# Patient Record
Sex: MALE | Race: WHITE | NOT HISPANIC OR LATINO | Employment: FULL TIME | URBAN - METROPOLITAN AREA
[De-identification: names, ages, dates, MRNs, and addresses within clinical notes are randomized per-mention and may not be internally consistent; named-entity substitution may affect disease eponyms.]

---

## 2018-12-04 ENCOUNTER — HOSPITAL ENCOUNTER (OUTPATIENT)
Dept: RADIOLOGY | Facility: MEDICAL CENTER | Age: 48
End: 2018-12-04
Attending: PHYSICIAN ASSISTANT
Payer: COMMERCIAL

## 2018-12-04 ENCOUNTER — OFFICE VISIT (OUTPATIENT)
Dept: URGENT CARE | Facility: CLINIC | Age: 48
End: 2018-12-04
Payer: COMMERCIAL

## 2018-12-04 VITALS
HEART RATE: 89 BPM | HEIGHT: 69 IN | OXYGEN SATURATION: 96 % | DIASTOLIC BLOOD PRESSURE: 80 MMHG | BODY MASS INDEX: 34.8 KG/M2 | TEMPERATURE: 98.3 F | SYSTOLIC BLOOD PRESSURE: 130 MMHG | WEIGHT: 235 LBS

## 2018-12-04 DIAGNOSIS — Z87.442 HISTORY OF KIDNEY STONES: ICD-10-CM

## 2018-12-04 DIAGNOSIS — R10.9 RIGHT FLANK PAIN: ICD-10-CM

## 2018-12-04 DIAGNOSIS — N20.0 MULTIPLE RENAL CALCULI: ICD-10-CM

## 2018-12-04 DIAGNOSIS — N50.811 PAIN IN RIGHT TESTICLE: Primary | ICD-10-CM

## 2018-12-04 DIAGNOSIS — N50.811 PAIN IN RIGHT TESTICLE: ICD-10-CM

## 2018-12-04 PROCEDURE — 74176 CT ABD & PELVIS W/O CONTRAST: CPT

## 2018-12-04 PROCEDURE — 99204 OFFICE O/P NEW MOD 45 MIN: CPT | Performed by: PHYSICIAN ASSISTANT

## 2018-12-04 PROCEDURE — 76870 US EXAM SCROTUM: CPT

## 2018-12-04 RX ORDER — HYDROCODONE BITARTRATE AND ACETAMINOPHEN 5; 325 MG/1; MG/1
1-2 TABLET ORAL EVERY 4 HOURS PRN
Qty: 20 TAB | Refills: 0 | Status: SHIPPED | OUTPATIENT
Start: 2018-12-04 | End: 2018-12-07

## 2018-12-04 RX ORDER — TAMSULOSIN HYDROCHLORIDE 0.4 MG/1
0.4 CAPSULE ORAL
Qty: 30 CAP | Refills: 0 | Status: SHIPPED | OUTPATIENT
Start: 2018-12-04

## 2018-12-04 RX ORDER — SULFAMETHOXAZOLE AND TRIMETHOPRIM 800; 160 MG/1; MG/1
1 TABLET ORAL 2 TIMES DAILY
Qty: 20 TAB | Refills: 0 | Status: SHIPPED | OUTPATIENT
Start: 2018-12-04 | End: 2018-12-14

## 2018-12-04 RX ORDER — METHYLPREDNISOLONE 4 MG/1
4 TABLET ORAL DAILY
Qty: 21 TAB | Refills: 0 | Status: SHIPPED | OUTPATIENT
Start: 2018-12-04 | End: 2018-12-10

## 2018-12-04 ASSESSMENT — PATIENT HEALTH QUESTIONNAIRE - PHQ9: CLINICAL INTERPRETATION OF PHQ2 SCORE: 0

## 2018-12-04 NOTE — PATIENT INSTRUCTIONS
Orchitis  Introduction  Orchitis is swelling (inflammation) of a testicle caused by infection. Testicles are the male organs that produce sperm. The testicles are held in a fleshy sac (scrotum) located behind the penis. Orchitis usually affects only one testicle, but it can occur in both. The condition can develop suddenly.  Orchitis can be caused by many different kinds of bacteria and viruses.  What are the causes?  Orchitis can be caused by either a bacterial or viral infection.  Bacterial Infections  · These often occur along with an infection of the coiled tube that collects sperm and sits on top of the testicle (epididymis).  · In men who are not sexually active, bacterial orchitis usually starts as a urinary tract infection and spreads to the testicle.  · In sexually active men, sexually transmitted infections are the most common cause of bacterial orchitis. These can include:  ¨ Gonorrhea.  ¨ Chlamydia.  Viral Infections  · Mumps is still the most common cause of viral orchitis, though mumps is now rare in many areas because of vaccination.  · Other viruses that can cause orchitis include:  ¨ The chickenpox virus (varicella-zoster virus).  ¨ The virus that causes mononucleosis (Shannan-Barr virus).  What increases the risk?  Boys and men who have not been vaccinated against mumps are at risk for mumps orchitis.  Risk factors for bacterial orchitis include:  · Frequent urinary tract infections.  · High-risk sexual behaviors.  · Having a sexual partner with a sexually transmitted infection.  · Having had urinary tract surgery.  · Using a tube passed through the penis to drain urine (Brar catheter).  · An enlarged prostate gland.  What are the signs or symptoms?  The most common symptoms of orchitis are swelling and pain in the scrotum. Other signs and symptoms may include:  · Feeling generally sick (malaise).  · Fever and chills.  · Painful urination.  · Painful ejaculation.  · Blood or discharge from the  penis.  · Nausea.  · Headache.  · Fatigue.  How is this diagnosed?  Your health care provider may suspect orchitis if you have a painful, swollen testicle along with other signs and symptoms of the condition. A physical exam will be done. Tests may also be done to help your health care provider make a diagnosis. These may include:  · A blood test to check for signs of infection.  · A urine test to check for a urinary tract infection.  · Using a swab to collect a fluid sample from the tip of the penis to test for sexually transmitted infections.  · Taking an image of the testicle using sound waves and a computer (testicular ultrasound).  How is this treated?  Treatment of orchitis depends on the cause. For orchitis caused by a bacterial infection, your health care provider will most likely prescribe antibiotic medicines. Bacterial infections usually clear up within a few days.  Both viral infections and bacterial infections may be treated with:  · Bed rest.  · Anti-inflammatory medicines.  · Pain medicines.  · Elevating the scrotum and applying ice.  Follow these instructions at home:  · Rest as directed by your health care provider.  · Take medicines only as directed by your health care provider.  · If you were prescribed an antibiotic medicine, finish it all even if you start to feel better.  · Elevate your scrotum and apply ice as directed:  ¨ Put ice in a plastic bag.  ¨ Place a small towel or pillow between your legs.  ¨ Rest your scrotum on the pillow or towel.  ¨ Place another towel between your skin and the plastic bag.  ¨ Leave the ice on for 20 minutes, 2-3 times a day.  Contact a health care provider if:  · You have a fever.  · Pain and swelling have not gotten better after 3 days.  Get help right away if:  · Your pain is getting worse.  · The swelling in your testicle gets worse.  This information is not intended to replace advice given to you by your health care provider. Make sure you discuss any  questions you have with your health care provider.  Document Released: 12/15/2001 Document Revised: 05/25/2017 Document Reviewed: 05/07/2015  © 2017 Elsevier

## 2018-12-04 NOTE — PROGRESS NOTES
"Subjective:      Pt is a 48 y.o. male who presents with Testicle Pain (right side ) and Back Pain (lower right )            HPI  This is a new problem. Pt notes ongoing right testicle pain x 2 weeks with right flank pain as well. Pt notes waxing and waning pain with the worst pain this morning that he had to leave work. Pt has not taken any Rx medications for this condition. Pt states the pain is a 7/10, aching in nature and worse \"right now\". Pt notes hx of passing 4 kidney stones and is unsure of the etiology of the pain. Pt notes flatulence and urination worsen the pain.  Pt denies CP, SOB, NVD, paresthesias, headaches, dizziness, change in vision, hives, or other joint pain. The pt's medication list, problem list, and allergies have been evaluated and reviewed during today's visit.    PMH:  Past Medical History:   Diagnosis Date   • Hypertension    • Indigestion    • Kidney stones    • Psychiatric problem     depression anxiety       PSH:  Negative per pt.      Fam Hx:  the patient's family history is not pertinent to their current complaint      Soc HX:  Social History     Social History   • Marital status: Single     Spouse name: N/A   • Number of children: N/A   • Years of education: N/A     Occupational History   • Not on file.     Social History Main Topics   • Smoking status: Current Every Day Smoker     Packs/day: 0.50     Years: 18.00   • Smokeless tobacco: Never Used   • Alcohol use Yes      Comment: 1/5 of michael a day the last few days   • Drug use: No   • Sexual activity: Not on file     Other Topics Concern   • Not on file     Social History Narrative   • No narrative on file         Medications:    Current Outpatient Prescriptions:   •  HYDROcodone-acetaminophen (NORCO) 5-325 MG Tab per tablet, Take 1-2 Tabs by mouth every four hours as needed for up to 3 days., Disp: 20 Tab, Rfl: 0  •  meclizine (ANTIVERT) 25 MG TABS, Take 1 Tab by mouth 3 times a day as needed., Disp: 30 Each, Rfl: " "1      Allergies:  Nkda [no known drug allergy]    ROS  Constitutional: Negative for fever, chills and malaise/fatigue.   HENT: Negative for congestion and sore throat.    Eyes: Negative for blurred vision, double vision and photophobia.   Respiratory: Negative for cough and shortness of breath.  Cardiovascular: Negative for chest pain and palpitations.   Gastrointestinal: Negative for heartburn, nausea, vomiting, abdominal pain, diarrhea and constipation.   Genitourinary: Negative for dysuria and POS for right flank pain and right testicle pain  Musculoskeletal: Negative for joint pain and myalgias.   Skin: Negative for itching and rash.   Neurological: Negative for dizziness, tingling and headaches.   Endo/Heme/Allergies: Does not bruise/bleed easily.   Psychiatric/Behavioral: Negative for depression. The patient is not nervous/anxious.           Objective:     /80 (BP Location: Left arm, Patient Position: Sitting, BP Cuff Size: Adult)   Pulse 89   Temp 36.8 °C (98.3 °F)   Ht 1.753 m (5' 9\")   Wt 106.6 kg (235 lb)   SpO2 96%   BMI 34.70 kg/m²      Physical Exam   Abdominal: Hernia confirmed negative in the right inguinal area and confirmed negative in the left inguinal area.   Genitourinary: Penis normal. Cremasteric reflex is present. Right testis shows swelling and tenderness. Right testis shows no mass. Right testis is descended. Cremasteric reflex is not absent on the right side. Left testis shows no mass, no swelling and no tenderness. Left testis is descended. Cremasteric reflex is not absent on the left side. Circumcised. No phimosis, paraphimosis, hypospadias, penile erythema or penile tenderness. No discharge found.         Musculoskeletal:        Back:    Lymphadenopathy: No inguinal adenopathy noted on the right or left side.         Constitutional: PT is oriented to person, place, and time. PT appears well-developed and well-nourished. No distress.   HENT:   Head: Normocephalic and " atraumatic.   Mouth/Throat: Oropharynx is clear and moist. No oropharyngeal exudate.   Eyes: Conjunctivae normal and EOM are normal. Pupils are equal, round, and reactive to light.   Neck: Normal range of motion. Neck supple. No thyromegaly present.   Cardiovascular: Normal rate, regular rhythm, normal heart sounds and intact distal pulses.  Exam reveals no gallop and no friction rub.    No murmur heard.  Pulmonary/Chest: Effort normal and breath sounds normal. No respiratory distress. PT has no wheezes. PT has no rales. Pt exhibits no tenderness.   Abdominal: Soft. Bowel sounds are normal. PT exhibits no distension and no mass. There is no tenderness. There is no rebound and no guarding.   Musculoskeletal: Normal range of motion. PT exhibits no edema and no tenderness.   Neurological: PT is alert and oriented to person, place, and time. PT has normal reflexes. No cranial nerve deficit.   Skin: Skin is warm and dry. No rash noted. PT is not diaphoretic. No erythema.       Psychiatric: PT has a normal mood and affect. PT behavior is normal. Judgment and thought content normal.     RADS:    US:   Narrative       12/4/2018 4:41 PM    HISTORY/REASON FOR EXAM: Right testicular pain.    TECHNIQUE/EXAM DESCRIPTION:  Real-time sonography of the scrotum was performed with gray-scale, color and duplex Doppler imaging.    COMPARISON: None    FINDINGS:  The testes are homogeneous in echotexture and low-resistive waveforms are confirmed bilaterally. No intratesticular mass is seen. Vascular flow is symmetric.    The right testis measures 4.8 x 3.1 x 2.4 cm.  The left testis measures 4.8 x 3 x 2.4 cm.    Appearance of the epididymides are within normal limits.    No abnormal volume hydrocele is detected.    No varicocele is detected.    A small fat-containing left inguinal hernia is incidentally noted.   Impression       1.  No intratesticular mass or evidence of torsion.    2.  Small fat-containing left inguinal hernia.    Reading Provider Reading Date   Martha Schroeder M.D. Dec 4, 2018   Signing Provider Signing Date Signing Time   Martha Schroeder M.D. Dec 4, 2018  5:22 PM     CT:  Narrative       12/4/2018 4:38 PM    HISTORY/REASON FOR EXAM:  right testicle pain x 2 weeks with hx of kidney stones  Right flank pain    TECHNIQUE/EXAM DESCRIPTION AND NUMBER OF VIEWS:  CT scan renal/colic without contrast.    5 mm helical images of the abdomen and pelvis were obtained from the diaphragmatic domes through the pubic symphysis.    Low dose optimization technique was utilized for this CT exam including automated exposure control and adjustment of the mA and/or kV according to patient size.    COMPARISON: 1/25/2018.    FINDINGS:    Renal stone: Bilateral nonobstructive renal calculi. The bilateral kidneys appear unremarkable.    Lung Bases:    No pulmonary nodules at the lung bases. No pleural or pericardial fluid.    Abdomen:    Within the limits of noncontrast technique, the spleen, pancreas, and adrenal glands are unremarkable in appearance.    Hepatic steatosis.    The gallbladder is unremarkable.    There is no biliary dilatation. There is no bowel wall thickening or abnormal dilatation.    The abdominal aorta is normal in caliber.    Pelvis:    Right pelvic phleboliths.    Unremarkable urinary bladder.    No lymph node enlargement.    No free fluid, or free air in the abdomen or pelvis.    No aggressive bone lesions are seen.    _____________________________________   Impression         1. Nonobstructive bilateral renal calculi. No renal collecting system in dilatation or ureteral calculus.    2. No evidence of inflammatory change in the abdomen or pelvis.  The study is however limited due to nonuse of intravenous contrast    3. Hepatic steatosis.   Reading Provider Reading Date   Agustin Whittaker M.D. Dec 4, 2018   Signing Provider Signing Date Signing Time   Agustin Whittaker M.D. Dec 4, 2018  5:14 PM             Assessment/Plan:     1. Pain in right testicle    - CT-RENAL COLIC EVALUATION(A/P W/O); Future  - [id number]; Future  - Consent for Opiate Prescription  - HYDROcodone-acetaminophen (NORCO) 5-325 MG Tab per tablet; Take 1-2 Tabs by mouth every four hours as needed for up to 3 days.  Dispense: 20 Tab; Refill: 0    2. Right flank pain    3. History of kidney stones    - CT-RENAL COLIC EVALUATION(A/P W/O); Future    Bactrim  Medrol  Flomax  Referral to Urology  Concern for testicular torsion, orchitis, epididymitis, nephrolithiasis with stone obstructing ureter:  [place]  Aware web site evaluation: I have obtained and reviewed patient utilization report from [facility] pharmacy database prior to writing prescription for controlled substance II, III or IV per [place] bill . Based on the report and my clinical assessment the prescription is medically necessary.   NSAIDs for pain 1-5, Norco for pain 6-10 or to help get to sleep.  Rest, fluids encouraged.  AVS with medical info given.  Pt was in full understanding and agreement with the plan.  Follow-up as needed if symptoms worsen or fail to improve.

## 2018-12-11 ENCOUNTER — OFFICE VISIT (OUTPATIENT)
Dept: URGENT CARE | Facility: CLINIC | Age: 48
End: 2018-12-11
Payer: COMMERCIAL

## 2018-12-11 VITALS
RESPIRATION RATE: 20 BRPM | OXYGEN SATURATION: 97 % | HEIGHT: 69 IN | SYSTOLIC BLOOD PRESSURE: 130 MMHG | WEIGHT: 235 LBS | HEART RATE: 90 BPM | TEMPERATURE: 97.2 F | DIASTOLIC BLOOD PRESSURE: 85 MMHG | BODY MASS INDEX: 34.8 KG/M2

## 2018-12-11 DIAGNOSIS — N20.0 BILATERAL NEPHROLITHIASIS: Primary | ICD-10-CM

## 2018-12-11 DIAGNOSIS — K40.90 NON-RECURRENT UNILATERAL INGUINAL HERNIA WITHOUT OBSTRUCTION OR GANGRENE: ICD-10-CM

## 2018-12-11 PROCEDURE — 99213 OFFICE O/P EST LOW 20 MIN: CPT | Performed by: PHYSICIAN ASSISTANT

## 2018-12-11 RX ORDER — HYDROCODONE BITARTRATE AND ACETAMINOPHEN 5; 325 MG/1; MG/1
1-2 TABLET ORAL EVERY 4 HOURS PRN
Qty: 20 TAB | Refills: 0 | Status: SHIPPED | OUTPATIENT
Start: 2018-12-11 | End: 2018-12-14

## 2018-12-11 NOTE — PATIENT INSTRUCTIONS
Dietary Guidelines to Help Prevent Kidney Stones  Your risk of kidney stones can be decreased by adjusting the foods you eat. The most important thing you can do is drink enough fluid. You should drink enough fluid to keep your urine clear or pale yellow. The following guidelines provide specific information for the type of kidney stone you have had.  GUIDELINES ACCORDING TO TYPE OF KIDNEY STONE  Calcium Oxalate Kidney Stones  · Reduce the amount of salt you eat. Foods that have a lot of salt cause your body to release excess calcium into your urine. The excess calcium can combine with a substance called oxalate to form kidney stones.  · Reduce the amount of animal protein you eat if the amount you eat is excessive. Animal protein causes your body to release excess calcium into your urine. Ask your dietitian how much protein from animal sources you should be eating.  · Avoid foods that are high in oxalates. If you take vitamins, they should have less than 500 mg of vitamin C. Your body turns vitamin C into oxalates. You do not need to avoid fruits and vegetables high in vitamin C.  Calcium Phosphate Kidney Stones  · Reduce the amount of salt you eat to help prevent the release of excess calcium into your urine.  · Reduce the amount of animal protein you eat if the amount you eat is excessive. Animal protein causes your body to release excess calcium into your urine. Ask your dietitian how much protein from animal sources you should be eating.  · Get enough calcium from food or take a calcium supplement (ask your dietitian for recommendations). Food sources of calcium that do not increase your risk of kidney stones include:  ¨ Broccoli.  ¨ Dairy products, such as cheese and yogurt.  ¨ Pudding.  Uric Acid Kidney Stones  · Do not have more than 6 oz of animal protein per day.  FOOD SOURCES  Animal Protein Sources  · Meat (all types).  · Poultry.  · Eggs.  · Fish, seafood.  Foods High in Salt  · Salt seasonings.  · Soy  sauce.  · Teriyaki sauce.  · Cured and processed meats.  · Salted crackers and snack foods.  · Fast food.  · Canned soups and most canned foods.  Foods High in Oxalates  · Grains:  ¨ Amaranth.  ¨ Barley.  ¨ Grits.  ¨ Wheat germ.  ¨ Bran.  ¨ Buckwheat flour.  ¨ All bran cereals.  ¨ Pretzels.  ¨ Whole wheat bread.  · Vegetables:  ¨ Beans (wax).  ¨ Beets and beet greens.  ¨ Trisha greens.  ¨ Eggplant.  ¨ Escarole.  ¨ Leeks.  ¨ Okra.  ¨ Parsley.  ¨ Rutabagas.  ¨ Spinach.  ¨ Swiss chard.  ¨ Tomato paste.  ¨ Fried potatoes.  ¨ Sweet potatoes.  · Fruits:  ¨ Red currants.  ¨ Figs.  ¨ Kiwi.  ¨ Rhubarb.  · Meat and Other Protein Sources:  ¨ Beans (dried).  ¨ Soy burgers and other soybean products.  ¨ Miso.  ¨ Nuts (peanuts, almonds, pecans, cashews, hazelnuts).  ¨ Nut butters.  ¨ Sesame seeds and tahini (paste made of sesame seeds).  ¨ Poppy seeds.  · Beverages:  ¨ Chocolate drink mixes.  ¨ Soy milk.  ¨ Instant iced tea.  ¨ Juices made from high-oxalate fruits or vegetables.  · Other:  ¨ Carob.  ¨ Chocolate.  ¨ Fruitcake.  ¨ Marmalades.     This information is not intended to replace advice given to you by your health care provider. Make sure you discuss any questions you have with your health care provider.     Document Released: 04/13/2012 Document Revised: 12/23/2014 Document Reviewed: 11/14/2014  Elsevier Interactive Patient Education ©2016 [employer]

## 2018-12-11 NOTE — PROGRESS NOTES
Subjective:      Pt is a 48 y.o. male who presents with Pain            HPI  This is a new problem. Pt returns from initial visit on 12/4/18, 7 days later with continuing flank pain from diagnosis of B/L kidney stones and flank pain x 7 days now. Pt states he had not received a phone call or letter yet regarding the urology referral. Pt has not taken any Rx medications for this condition. Pt states right testicle has resolved with symptoms since last visit.  Pt states the pain is a 6-7/10, aching in nature and worse at night. Pt denies CP, SOB, NVD, paresthesias, headaches, dizziness, change in vision, hives, or other joint pain. The pt's medication list, problem list, and allergies have been evaluated and reviewed during today's visit.      PMH:  Past Medical History:   Diagnosis Date   • Hypertension    • Indigestion    • Kidney stones    • Psychiatric problem     depression anxiety       PSH:  Negative per pt.      Fam Hx:  the patient's family history is not pertinent to their current complaint    Soc HX:  Social History     Social History   • Marital status: Single     Spouse name: N/A   • Number of children: N/A   • Years of education: N/A     Occupational History   • Not on file.     Social History Main Topics   • Smoking status: Current Every Day Smoker     Packs/day: 0.50     Years: 18.00   • Smokeless tobacco: Never Used   • Alcohol use Yes      Comment: 1/5 of michael a day the last few days   • Drug use: No   • Sexual activity: Not on file     Other Topics Concern   • Not on file     Social History Narrative   • No narrative on file         Medications:    Current Outpatient Prescriptions:   •  HYDROcodone-acetaminophen (NORCO) 5-325 MG Tab per tablet, Take 1-2 Tabs by mouth every four hours as needed for up to 3 days., Disp: 20 Tab, Rfl: 0  •  sulfamethoxazole-trimethoprim (BACTRIM DS) 800-160 MG tablet, Take 1 Tab by mouth 2 times a day for 10 days., Disp: 20 Tab, Rfl: 0  •  tamsulosin (FLOMAX) 0.4 MG  "capsule, Take 1 Cap by mouth ONE-HALF HOUR AFTER BREAKFAST., Disp: 30 Cap, Rfl: 0  •  meclizine (ANTIVERT) 25 MG TABS, Take 1 Tab by mouth 3 times a day as needed., Disp: 30 Each, Rfl: 1      Allergies:  Nkda [no known drug allergy]    ROS  Constitutional: Negative for fever, chills and malaise/fatigue.   HENT: Negative for congestion and sore throat.    Eyes: Negative for blurred vision, double vision and photophobia.   Respiratory: Negative for cough and shortness of breath.  Cardiovascular: Negative for chest pain and palpitations.   Gastrointestinal: Negative for heartburn, nausea, vomiting, abdominal pain, diarrhea and constipation.   Genitourinary: Negative for dysuria and +B/L flank pain.   Musculoskeletal: Negative for joint pain and myalgias.   Skin: Negative for itching and rash.   Neurological: Negative for dizziness, tingling and headaches.   Endo/Heme/Allergies: Does not bruise/bleed easily.   Psychiatric/Behavioral: Negative for depression. The patient is not nervous/anxious.           Objective:     /85 (BP Location: Right arm, Patient Position: Sitting, BP Cuff Size: Large adult)   Pulse 90   Temp 36.2 °C (97.2 °F) (Temporal)   Resp 20   Ht 1.753 m (5' 9\")   Wt 106.6 kg (235 lb)   SpO2 97%   BMI 34.70 kg/m²      Physical Exam      Constitutional: PT is oriented to person, place, and time. PT appears well-developed and well-nourished. No distress.   HENT:   Head: Normocephalic and atraumatic.   Mouth/Throat: Oropharynx is clear and moist. No oropharyngeal exudate.   Eyes: Conjunctivae normal and EOM are normal. Pupils are equal, round, and reactive to light.   Neck: Normal range of motion. Neck supple. No thyromegaly present.   Cardiovascular: Normal rate, regular rhythm, normal heart sounds and intact distal pulses.  Exam reveals no gallop and no friction rub.    No murmur heard.  Pulmonary/Chest: Effort normal and breath sounds normal. No respiratory distress. PT has no wheezes. PT has " no rales. Pt exhibits no tenderness.   Abdominal: Soft. Bowel sounds are normal. PT exhibits no distension and no mass. There is no tenderness. There is no rebound and no guarding.   Musculoskeletal: Normal range of motion. PT exhibits no edema and no tenderness. +B/L flank pain to percussion  : +left inguinal hernia noted on exam  Neurological: PT is alert and oriented to person, place, and time. PT has normal reflexes. No cranial nerve deficit.   Skin: Skin is warm and dry. No rash noted. PT is not diaphoretic. No erythema.       Psychiatric: PT has a normal mood and affect. PT behavior is normal. Judgment and thought content normal.          Assessment/Plan:     1. Bilateral nephrolithiasis    - HYDROcodone-acetaminophen (NORCO) 5-325 MG Tab per tablet; Take 1-2 Tabs by mouth every four hours as needed for up to 3 days.  Dispense: 20 Tab; Refill: 0  - Consent for Opiate Prescription    2. Non-recurrent unilateral inguinal hernia without obstruction or gangrene-left    - REFERRAL TO GENERAL SURGERY    [place]  Aware web site evaluation: I have obtained and reviewed patient utilization report from [facility] pharmacy database prior to writing prescription for controlled substance II, III or IV per [place] bill . Based on the report and my clinical assessment the prescription is medically necessary.   NSAIDs for pain 1-5, Norco for pain 6-10 or to help get to sleep.  Opiate consent signed  Called referral dept and letter went out 5 days ago on 12./6/18 and pt notes he did not receive it   Gave pt [place] [place] [facility] [place] phone number to call and make appt  Gen surg referral placed for small inguinal hernia  Rest, fluids encouraged.  AVS with medical info given.  Pt was in full understanding and agreement with the plan.  Follow-up as needed if symptoms worsen or fail to improve.    Encounter notes reviewed, I was not present to physically examine patient myself. No obvious and/or significant quality issues found  solely from doing a chart review.